# Patient Record
Sex: FEMALE | Race: WHITE | NOT HISPANIC OR LATINO | Employment: FULL TIME | URBAN - METROPOLITAN AREA
[De-identification: names, ages, dates, MRNs, and addresses within clinical notes are randomized per-mention and may not be internally consistent; named-entity substitution may affect disease eponyms.]

---

## 2018-04-19 ENCOUNTER — OFFICE VISIT (OUTPATIENT)
Dept: INTERNAL MEDICINE CLINIC | Facility: CLINIC | Age: 58
End: 2018-04-19
Payer: COMMERCIAL

## 2018-04-19 VITALS
HEART RATE: 67 BPM | SYSTOLIC BLOOD PRESSURE: 102 MMHG | DIASTOLIC BLOOD PRESSURE: 76 MMHG | OXYGEN SATURATION: 98 % | TEMPERATURE: 98.3 F | HEIGHT: 66 IN | WEIGHT: 128 LBS | BODY MASS INDEX: 20.57 KG/M2

## 2018-04-19 DIAGNOSIS — Z11.59 ENCOUNTER FOR HEPATITIS C SCREENING TEST FOR LOW RISK PATIENT: Primary | ICD-10-CM

## 2018-04-19 DIAGNOSIS — N89.8 VAGINAL DRYNESS: ICD-10-CM

## 2018-04-19 DIAGNOSIS — Z12.31 ENCOUNTER FOR SCREENING MAMMOGRAM FOR MALIGNANT NEOPLASM OF BREAST: ICD-10-CM

## 2018-04-19 DIAGNOSIS — R06.83 SNORING: ICD-10-CM

## 2018-04-19 DIAGNOSIS — R09.81 NASAL CONGESTION: ICD-10-CM

## 2018-04-19 DIAGNOSIS — Z12.11 SCREENING FOR COLON CANCER: ICD-10-CM

## 2018-04-19 DIAGNOSIS — Z13.220 SCREENING CHOLESTEROL LEVEL: ICD-10-CM

## 2018-04-19 DIAGNOSIS — Z00.00 HEALTH CARE MAINTENANCE: ICD-10-CM

## 2018-04-19 PROCEDURE — 99386 PREV VISIT NEW AGE 40-64: CPT | Performed by: FAMILY MEDICINE

## 2018-04-19 RX ORDER — FLUTICASONE PROPIONATE 50 MCG
2 SPRAY, SUSPENSION (ML) NASAL DAILY
COMMUNITY
Start: 2014-09-08

## 2018-04-19 RX ORDER — BIOTIN 1 MG
1 TABLET ORAL DAILY
COMMUNITY

## 2018-04-19 NOTE — PROGRESS NOTES
Subjective:       Anne-Marie Saeed is a 62 y o  female/male who presents for a health maintenance physical exam  Patient denies any current health related concerns  Immunization History   Administered Date(s) Administered    Influenza TIV (IM) 10/18/2010        Immunization status: up to date and documented  (influenza, prevnar, pneumococcal, zoster, Tdap)         full time job doing wound care in outpt setting,     Tobacco use: none  Alcohol use: social   Illicit drug use: none    well balanced diet  aerobic conditioning and walkingdaily    Vision problem: no  Hearing problem: no  Regular dentist visits: yes    Yes - single partner - male male    Screenings:  colonoscopy 5 years ago without abnormalities    Last mammogram: yearly  Result: normal  Last PAP: 2013  Result normal  Last DEXA scan: none     Patient does perform monthly self breast exam    Screenings performed:  HbA1C  Lipid panel  Glucose        The following portions of the patient's history were reviewed and updated as appropriate: allergies, current medications, past family history, past medical history, past social history, past surgical history and problem list      Review of Systems  Review of Systems       Past Medical History:   Diagnosis Date    Gall bladder disease     Lipoma     last assessed 10/29/13     Past Surgical History:   Procedure Laterality Date    CHOLECYSTECTOMY       Social History   Substance Use Topics    Smoking status: Never Smoker    Smokeless tobacco: Never Used    Alcohol use Yes      Comment: 3-4 times a week / social use     Family History   Problem Relation Age of Onset    Hypertension Mother     Breast cancer Mother     Cancer Mother      bladder    Myasthenia gravis Mother     Colon cancer Father     Glaucoma Father     Hypertension Father     Other Sister      diverticular disease    Gallbladder disease Sister     Thyroid nodules Sister     Stroke Paternal Grandmother      CVA     Patient has no known allergies  Current Outpatient Prescriptions   Medication Sig Dispense Refill    Cetirizine HCl (ZYRTEC ALLERGY) 10 MG CAPS Take by mouth      Cholecalciferol (VITAMIN D3) 1000 units CAPS Take 1 capsule by mouth daily      fluticasone (FLONASE) 50 mcg/act nasal spray 2 sprays into each nostril daily      Multiple Vitamins-Minerals (CENTRUM SILVER 50+WOMEN PO) Take 1 capsule by mouth daily      conjugated estrogens (PREMARIN) vaginal cream Insert 0 5 g into the vagina daily M,W, F only NOT daily 42 5 g 2     No current facility-administered medications for this visit  Objective:       /76 (BP Location: Left arm, Patient Position: Sitting, Cuff Size: Adult)   Pulse 67   Temp 98 3 °F (36 8 °C) (Oral)   Ht 5' 6" (1 676 m)   Wt 58 1 kg (128 lb)   SpO2 98%   BMI 20 66 kg/m²   Physical Exam   Constitutional:  Well developed, well nourished, no acute distress, non-toxic appearance   Eyes:  PERRL, conjunctiva normal , non icteric sclera  HENT:  Atraumatic, oropharynx moist  Neck-  supple   Respiratory:  CTA b/l, normal breath sounds, no rales, no wheezing   Cardiovascular:  RRR, no murmurs, no LE edema b/l  GI:  Soft, nondistended, normal bowel sounds x 4, nontender, no organomegaly, no mass, no rebound, no guarding   Neurologic:  no focal deficits noted   Psychiatric:  Speech and behavior appropriate , AAO x 3           Assessment:     health maintanence        Plan:         Discussed to have colonoscopy which she is overdue for  She gets this every 5 years due to father's history of colon cancer, she is up-to-date on mammogram which she gets yearly, she is not old enough for bone density study however falls of the eye doctor and dental visits  Advised to get gyn exam for which she is also overdue for  Discussed this personally but she would like to hold off at this time

## 2018-04-19 NOTE — PROGRESS NOTES
Assessment/Plan:    Encounter for hepatitis C screening test for low risk patient  Check labs    Nasal congestion  Allergies with spring, cont with flonase and zyrtec  Avoid sudafed due to elevated BP  Ok to use OTC coresedin HBP    Snoring  Check for DAMIEN, gasping noted  Will check if we can do home sleep study    Vaginal dryness  Trial cream, discussed         Problem List Items Addressed This Visit        Genitourinary    Vaginal dryness     Trial cream, discussed         Relevant Medications    conjugated estrogens (PREMARIN) vaginal cream       Other    Encounter for hepatitis C screening test for low risk patient - Primary     Check labs         Relevant Orders    Hepatitis C antibody    Nasal congestion     Allergies with spring, cont with flonase and zyrtec  Avoid sudafed due to elevated BP  Ok to use OTC coresedin HBP         Relevant Orders    Comprehensive metabolic panel    CBC and differential    Snoring     Check for DAMIEN, gasping noted  Will check if we can do home sleep study         Relevant Orders    Comprehensive metabolic panel    CBC and differential    Home Study    Screening cholesterol level    Relevant Orders    Lipid panel    Health care maintenance    Relevant Orders    Comprehensive metabolic panel    CBC and differential    TSH baseline            Subjective:      Patient ID: Sadie De Jesus is a 62 y o  female  HPI    Patient has not been seen in approximately 5 years and is here for complains of vaginal dryness, allergies in the her congestion, snoring, and would like some blood work done  She has dyspareunia and difficulty with Pap test which she is overdue for  She never did try question cream that was that prescribed by her gyn but would like to try it now  She tried something over the counter which cannot remember  Snoring, witnessed apnea my  and patient is allowed enough that she was woken herself up    She has daytime fatigue and somnolence has never been tested for sleep apnea  Symptoms is exacerbated by spring allergies however she has yearly snoring and gasping episodes  She would like to be tested  Seasonal allergies and nasal congestion  Worse in the springtime  Uses Zyrtec and Flonase regularly and occasionally uses Sudafed but she notices that increases her blood pressure if she uses for prolonged periods      The following portions of the patient's history were reviewed and updated as appropriate: allergies, current medications, past family history, past medical history, past social history, past surgical history and problem list     Review of Systems    Constitutional:  Denies fever or chills   Eyes:  Denies change in visual acuity   HENT:  Denies nasal congestion or sore throat   Respiratory:  Denies cough or shortness of breath or wheezing  Cardiovascular:  Denies palpitations or chest pain  GI:  Denies abdominal pain, nausea, or vomiting  Integument:  Denies rash   Neurologic:  Denies headache or focal weakness        Objective:      /76 (BP Location: Left arm, Patient Position: Sitting, Cuff Size: Adult)   Pulse 67   Temp 98 3 °F (36 8 °C) (Oral)   Ht 5' 6" (1 676 m)   Wt 58 1 kg (128 lb)   SpO2 98%   BMI 20 66 kg/m²          Physical Exam    Constitutional:  Well developed, well nourished, no acute distress, non-toxic appearance   Eyes:  PERRL, conjunctiva normal , non icteric sclera  HENT:  Atraumatic, oropharynx moist  Neck-  supple   Respiratory:  CTA b/l, normal breath sounds, no rales, no wheezing   Cardiovascular:  RRR, no murmurs, no LE edema b/l  GI:  Soft, nondistended, normal bowel sounds x 4, nontender, no organomegaly, no mass, no rebound, no guarding   Neurologic:  no focal deficits noted   Psychiatric:  Speech and behavior appropriate , AAO x 3

## 2018-04-19 NOTE — ASSESSMENT & PLAN NOTE
Allergies with spring, cont with flonase and zyrtec  Avoid sudafed due to elevated BP   Ok to use OTC coresedin HBP

## 2019-09-27 ENCOUNTER — TELEPHONE (OUTPATIENT)
Dept: INTERNAL MEDICINE CLINIC | Facility: CLINIC | Age: 59
End: 2019-09-27